# Patient Record
(demographics unavailable — no encounter records)

---

## 2025-02-07 NOTE — PHYSICAL EXAM
[Respiratory Effort] : normal respiratory effort [Normal Rate and Rhythm] : normal rate and rhythm [No Edema] : No edema [No Rash or Lesion] : No rash or lesion [Alert] : alert [Oriented to Person] : oriented to person [Oriented to Place] : oriented to place [Oriented to Time] : oriented to time [Calm] : calm [JVD] : no jugular venous distention  [de-identified] : Soft, nondistended, [de-identified] : External exam - posterior midline 3mm fissure with rolled edges, 4mm more superficial anterior fissure.  R lateral skin tag, moderately sized.  Tenderness elicited when fissures probed with cotton-tipped applicator.  RUBIO and anoscopy deferred for today. [de-identified] : NAD [de-identified] : Normocephalic [de-identified] : Moves all extremities

## 2025-02-07 NOTE — PLAN
[TextEntry] : -- Discussed with patient that she should begin a powder fiber supplement (Metamucil, Benefiber, psyllium, etc).  She was also advised to drink at least 8 glasses of water daily in addition. -- Miralax and stool softener may be added as needed -- Nifedipine ointment sent, patient provided with address to compounding pharmacy.  Apply pea-sized amount 4x daily, advised this can take 4-6 weeks for consistent effect. -- Can use Tylenol/ibuprofen, Sitz baths, and topical lidocaine (e.g. Recticare, other OTC preparations) for pain control in the interim. -- Follow up in 8 weeks for repeat evaluation, possible full anorectal exam -- Instructions given with aid of Lao video , same as above.

## 2025-02-07 NOTE — HISTORY OF PRESENT ILLNESS
[FreeTextEntry1] : 57yoF who presents with chief complaint of bleeding per rectum as well as pain particularly with BMs.  Symptoms are worse when constipated.  No current bowel regimen.  Her symptoms have been ongoing for the last 2-3 years.  Patient reports that she had a colonoscopy done last week by a GI in [place], but cannot recall the name of the physician today and she does not have the report available for my review.  She states that this was normal.  Yoruba video  #[id number] used as patient is primarily Yoruba speaking.   present in clinic today, speaks some English as well as Mitzy (his preferred) and Yoruba).

## 2025-02-07 NOTE — REASON FOR VISIT
[Initial Evaluation] : an initial evaluation [Spouse] : spouse [Language Line ] : provided by Language Line   [Interpreters_IDNumber] : [id number] [Interpreters_FullName] : KEIRA [FreeTextEntry3] : SHMUEL